# Patient Record
(demographics unavailable — no encounter records)

---

## 2018-01-16 NOTE — PROGRESS NOTES
Assessment    1  Varicose veins of left lower extremity (454 9) (C13 42)    Plan    1  Follow-up PRN Evaluation and Treatment  Follow-up  Status: Complete  Done:   [id number]   2  Gradient compression stocking, below knee, 20-30mm Hg, each; Status:Complete;     Done: [id number]   3  Support stockings can help keep the blood from pooling in the small veins in your feet   and legs ; Status:Complete;   Done: [id number]    Discussion/Summary  Discussion Summary:   Large truncal varicosities of the left leg  No associated discomfort, no swelling no stasis changes  Recommended compression socks  Rx given  Stockings to be worn on a daily basis  Follow up in the office with any issues  Of note he does not have a PCP and I encouraged he establish care  Chief Complaint  Chief Complaint Free Text Note Form: "I am here to have my left leg checked "         History of Present Illness  Varicose Veins [facility]: The patient is being seen for an initial evaluation of varicose veins  Referred by: Dr Thais Reyes  The patient is currently asymptomatic  These symptoms developed 25 year(s) ago  This patient has no history of DVT, pulmonary embolism, superficial venous thrombosis, or a hypercoagulable state  Evaluation and Treatment History: This patient has had no prior surgical treatment of the venous system  Current treatment includes  over the counter compression hose  He wears OTC compression about 1x week for the past 2 years   Free Text HPI: Lorne Mondragon is new to our office today and was referred Martine Quezada for evaluation of his left leg varicose veins  He's noticed a lump at his groin that he thought was a hernia and saw Dr Thais Reyes for this  The lump at his groin is consistent with a vein and he has large truncal varicose veins of the left lower leg below the knee  He denies any swelling, heaviness with walking, lower extremity fatigue, SVT/DVT or stasis changes  Pt has no current PCP   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  On his own he picked up a pair of OTC compression 2 years ago and wears them once a week on the left leg  Review of Systems  Complete Male - Vasc:   Constitutional: No fever or chills, feels well, no tiredness, no recent weight gain or weight loss  Eyes: No sudden vision loss, no blurred vision, no double vision  ENT: no earache, no nosebleeds, no sore throat, no hearing loss, no nasal discharge and no hoarseness  Cardiovascular: no painful veins, no leg pain with walking and no bleeding veins, but regular heart rate, no chest pain, no intermittent leg claudication and no palpitations  Respiratory: No sob, no wheezing, no cough, no sob with exertion, no orthopnea  Gastrointestinal: No nausea, No vomiting, no diarrhea, no blood in stool  Genitourinary: no dysuria, no hematuria, No urinary incontinence, no erectile dysfunction  Musculoskeletal: no limb pain, no limb swelling  Integumentary: no rash, no lesions, no wounds, no ulcer  Neurological: no dementia, but no dementia, no headache, no numbness, no limb weakness, no dizziness, no difficulty walking, no headache, no numbness, no confusion, no dizziness, no limb weakness, no convulsions, no fainting and no difficulty walking  Psychiatric: no depression, no mood disorders, no anxiety  Hematologic/Lymphatic: no bleeding disorder, no easy bruising  ROS Reviewed:   ROS reviewed  Past Medical History    1  History of Status post hernia repair (V45 89) (Z98 890,Z87 19)  Active Problems And Past Medical History Reviewed: The active problems and past medical history were reviewed and updated today  Surgical History  Surgical History Reviewed: The surgical history was reviewed and updated today  Family History  Father    1  Family history of hyperlipidemia (V18 19) (Z83 49)  Family History    2   Family history of varicose veins (V17 49) (Z82 49)  Family History Reviewed: The family history was reviewed and updated today  Social History    ·    · Full-time employment   · Never a smoker   · No drug use   · Occasional alcohol use  Social History Reviewed: The social history was reviewed and updated today  Current Meds   1  No Reported Medications Recorded  Medication List Reviewed: The medication list was reviewed and updated today  Allergies    1  No Known Drug Allergies    Vitals  Vital Signs    Recorded: [id number] 80:54ZE   Systolic 971, LUE, Sitting   Diastolic 76, LUE, Sitting   Heart Rate 72, L Radial   Pulse Quality Normal, L Radial   Respiration Quality Normal   Respiration 16   Height 5 ft 9 in   Weight 167 lb 8 oz   BMI Calculated 24 74   BSA Calculated 1 92     Physical Exam    Posterior tibialis: right 2+ and left 2+  Dorsalis pedis: right 2+ and left 2+  Distal Pulse Exam: Normal Capillary Refill  Extremities: No upper or lower extremity edema  LE Varicose Veins: left leg truncal veins, left leg reticular veins and left leg spider veins  Pulmonary   Respiratory effort: No increased work of breathing or signs of respiratory distress  Psychiatric   Orientation to person, place and time: Normal    Mood and affect: Normal    Neurologic Sensory exam normal   Motor skills intact  Musculoskeletal   Gait and station: Normal    Skin   Skin and subcutaneous tissue: Normal without rashes or lesions  Palpation of skin and subcutaneous tissue: Normal turgor     Venous Disease: No lipodermatosclerosis, stasis dermatitis, hyperpigmentation, or atrophie shayy noted on exam       Signatures   Electronically signed by : Bradley Mercedes; Nov 2 2016  2:18PM EST                       (Author)    Electronically signed by : Nurys Thomas MD; Nov 2 2016  8:14PM EST

## 2021-04-08 DIAGNOSIS — Z23 ENCOUNTER FOR IMMUNIZATION: ICD-10-CM
